# Patient Record
Sex: FEMALE | Race: WHITE | NOT HISPANIC OR LATINO | Employment: UNEMPLOYED | URBAN - METROPOLITAN AREA
[De-identification: names, ages, dates, MRNs, and addresses within clinical notes are randomized per-mention and may not be internally consistent; named-entity substitution may affect disease eponyms.]

---

## 2023-08-01 LAB — SARS-COV-2 RESULT: NOT DETECTED

## 2023-12-03 ENCOUNTER — OFFICE VISIT (OUTPATIENT)
Dept: URGENT CARE | Facility: CLINIC | Age: 68
End: 2023-12-03
Payer: COMMERCIAL

## 2023-12-03 VITALS
BODY MASS INDEX: 30.62 KG/M2 | OXYGEN SATURATION: 96 % | WEIGHT: 156.8 LBS | TEMPERATURE: 98.1 F | SYSTOLIC BLOOD PRESSURE: 109 MMHG | DIASTOLIC BLOOD PRESSURE: 73 MMHG | HEART RATE: 77 BPM

## 2023-12-03 DIAGNOSIS — J22 LOWER RESPIRATORY INFECTION (E.G., BRONCHITIS, PNEUMONIA, PNEUMONITIS, PULMONITIS): Primary | ICD-10-CM

## 2023-12-03 DIAGNOSIS — H10.31 ACUTE CONJUNCTIVITIS OF RIGHT EYE, UNSPECIFIED ACUTE CONJUNCTIVITIS TYPE: ICD-10-CM

## 2023-12-03 PROBLEM — M85.80 OSTEOPENIA, SENILE: Status: ACTIVE | Noted: 2023-12-03

## 2023-12-03 PROBLEM — E89.0 HYPOTHYROIDISM, POSTSURGICAL: Status: ACTIVE | Noted: 2023-12-03

## 2023-12-03 PROBLEM — E66.811 OBESITY, CLASS I, BMI 30.0-34.9 (SEE ACTUAL BMI): Status: ACTIVE | Noted: 2023-12-03

## 2023-12-03 PROBLEM — N60.12 FIBROCYSTIC BREAST CHANGES OF BOTH BREASTS: Status: ACTIVE | Noted: 2019-10-08

## 2023-12-03 PROBLEM — S99.922A INJURY OF LEFT TOE: Status: RESOLVED | Noted: 2023-12-03 | Resolved: 2023-12-03

## 2023-12-03 PROBLEM — L73.2 HIDRADENITIS SUPPURATIVA: Status: ACTIVE | Noted: 2019-09-26

## 2023-12-03 PROBLEM — E66.9 OBESITY, CLASS I, BMI 30.0-34.9 (SEE ACTUAL BMI): Status: ACTIVE | Noted: 2023-12-03

## 2023-12-03 PROBLEM — R73.03 PREDIABETES: Status: ACTIVE | Noted: 2023-12-03

## 2023-12-03 PROBLEM — I47.19 ATRIAL PAROXYSMAL TACHYCARDIA (CMS-HCC): Status: ACTIVE | Noted: 2023-12-03

## 2023-12-03 PROBLEM — U07.1 SEVERE ACUTE RESPIRATORY SYNDROME CORONAVIRUS 2 (SARS-COV-2) DETECTED: Status: RESOLVED | Noted: 2023-12-03 | Resolved: 2023-12-03

## 2023-12-03 PROBLEM — C73 MALIGNANT NEOPLASM OF THYROID GLAND (MULTI): Status: RESOLVED | Noted: 2023-12-03 | Resolved: 2023-12-03

## 2023-12-03 PROBLEM — N60.11 FIBROCYSTIC BREAST CHANGES OF BOTH BREASTS: Status: ACTIVE | Noted: 2019-10-08

## 2023-12-03 PROCEDURE — 99214 OFFICE O/P EST MOD 30 MIN: CPT

## 2023-12-03 PROCEDURE — 1159F MED LIST DOCD IN RCRD: CPT

## 2023-12-03 PROCEDURE — 1036F TOBACCO NON-USER: CPT

## 2023-12-03 RX ORDER — METOPROLOL TARTRATE 25 MG/1
25 TABLET, FILM COATED ORAL 2 TIMES DAILY
COMMUNITY
Start: 2020-02-04

## 2023-12-03 RX ORDER — AZELASTINE 1 MG/ML
2 SPRAY, METERED NASAL 2 TIMES DAILY
COMMUNITY
Start: 2023-11-15 | End: 2024-11-14

## 2023-12-03 RX ORDER — FLUTICASONE PROPIONATE 50 MCG
2 SPRAY, SUSPENSION (ML) NASAL
COMMUNITY
Start: 2023-11-15 | End: 2024-11-14

## 2023-12-03 RX ORDER — LANOLIN ALCOHOL/MO/W.PET/CERES
1000 CREAM (GRAM) TOPICAL
COMMUNITY

## 2023-12-03 RX ORDER — LEVOTHYROXINE SODIUM 100 UG/1
112 TABLET ORAL
COMMUNITY
Start: 2023-09-23

## 2023-12-03 RX ORDER — METFORMIN HYDROCHLORIDE 500 MG/1
500 TABLET ORAL
COMMUNITY
Start: 2022-06-06

## 2023-12-03 RX ORDER — BENZONATATE 200 MG/1
200 CAPSULE ORAL 3 TIMES DAILY PRN
Qty: 21 CAPSULE | Refills: 0 | Status: SHIPPED | OUTPATIENT
Start: 2023-12-03 | End: 2023-12-10

## 2023-12-03 RX ORDER — POLYMYXIN B SULFATE AND TRIMETHOPRIM 1; 10000 MG/ML; [USP'U]/ML
1 SOLUTION OPHTHALMIC EVERY 4 HOURS
Qty: 10 ML | Refills: 0 | Status: SHIPPED | OUTPATIENT
Start: 2023-12-03 | End: 2023-12-13

## 2023-12-03 RX ORDER — AZITHROMYCIN 250 MG/1
TABLET, FILM COATED ORAL
Qty: 6 TABLET | Refills: 0 | Status: SHIPPED | OUTPATIENT
Start: 2023-12-03 | End: 2023-12-30 | Stop reason: ALTCHOICE

## 2023-12-03 ASSESSMENT — ENCOUNTER SYMPTOMS
CARDIOVASCULAR NEGATIVE: 1
SHORTNESS OF BREATH: 0
PALPITATIONS: 0
FEVER: 0
DIARRHEA: 0
CONSTITUTIONAL NEGATIVE: 1
DIAPHORESIS: 0
HEADACHES: 1
RHINORRHEA: 1
COUGH: 1
LIGHT-HEADEDNESS: 0
DIZZINESS: 0
MUSCULOSKELETAL NEGATIVE: 1
SORE THROAT: 1
FATIGUE: 0
VOMITING: 0
MYALGIAS: 0
EYE DISCHARGE: 1
CHILLS: 0
ABDOMINAL PAIN: 0
EYE REDNESS: 1
GASTROINTESTINAL NEGATIVE: 1

## 2023-12-03 ASSESSMENT — VISUAL ACUITY: OU: 1

## 2023-12-03 NOTE — PROGRESS NOTES
Subjective     Susana Patel is a 68 y.o. female who presents for Cough.    Patient presents with dry cough, sore throat, congestion, and right eye redness since yesterday. Has tried Nyquil. Patient did have negative COVID test this morning. She states reports that she had the same symptoms starting 1 week ago, felt better for a couple days, and then worsened again today. She states that the cough never went away. She states that the right eye has been red with irritation and drainage for the last week+ as well.      History provided by:  Patient and medical records       /73   Pulse 77   Temp 36.7 °C (98.1 °F)   Wt 71.1 kg (156 lb 12.8 oz)   SpO2 96%   BMI 30.62 kg/m²    All vitals have been reviewed and are stable.    Review of Systems   Constitutional: Negative.  Negative for chills, diaphoresis, fatigue and fever.   HENT:  Positive for congestion, postnasal drip, rhinorrhea and sore throat.    Eyes:  Positive for discharge and redness.   Respiratory:  Positive for cough. Negative for shortness of breath.    Cardiovascular: Negative.  Negative for chest pain and palpitations.   Gastrointestinal: Negative.  Negative for abdominal pain, diarrhea and vomiting.   Musculoskeletal: Negative.  Negative for myalgias.   Skin: Negative.  Negative for rash.   Neurological:  Positive for headaches. Negative for dizziness and light-headedness.       Objective   Physical Exam  Vitals and nursing note reviewed.   Constitutional:       General: She is not in acute distress.     Appearance: Normal appearance. She is ill-appearing.   HENT:      Head: Normocephalic and atraumatic.      Right Ear: External ear normal.      Left Ear: External ear normal.      Nose: Mucosal edema, congestion and rhinorrhea present.      Mouth/Throat:      Lips: Pink.      Mouth: Mucous membranes are moist.      Palate: No lesions.      Pharynx: Uvula midline. Oropharyngeal exudate and posterior oropharyngeal erythema present.      Tonsils:  No tonsillar exudate.   Eyes:      General: Lids are normal. Vision grossly intact.         Right eye: Discharge present. No hordeolum.      Extraocular Movements: Extraocular movements intact.      Conjunctiva/sclera:      Right eye: Right conjunctiva is not injected.      Left eye: Left conjunctiva is not injected.      Pupils: Pupils are equal, round, and reactive to light.   Cardiovascular:      Rate and Rhythm: Normal rate and regular rhythm.      Heart sounds: Normal heart sounds.   Pulmonary:      Effort: Pulmonary effort is normal. No respiratory distress.      Breath sounds: Normal air entry. No stridor. Decreased breath sounds present.   Abdominal:      General: Abdomen is flat.      Palpations: Abdomen is soft.   Musculoskeletal:         General: Normal range of motion.      Cervical back: Normal range of motion and neck supple.   Lymphadenopathy:      Cervical: No cervical adenopathy.   Skin:     General: Skin is warm and dry.   Neurological:      General: No focal deficit present.      Mental Status: She is alert and oriented to person, place, and time. Mental status is at baseline.   Psychiatric:         Mood and Affect: Mood normal.         Behavior: Behavior normal.         Assessment/Plan   Problem List Items Addressed This Visit    None  Visit Diagnoses       Lower respiratory infection (e.g., bronchitis, pneumonia, pneumonitis, pulmonitis)    -  Primary    Relevant Medications    azithromycin (Zithromax) 250 mg tablet    benzonatate (Tessalon) 200 mg capsule    Acute conjunctivitis of right eye, unspecified acute conjunctivitis type        Relevant Medications    polymyxin B sulf-trimethoprim (Polytrim) ophthalmic solution            Red flags for reporting to ER have been reviewed with the patient.    Current diagnosis, any medication changes, and all in-office lab or radiologic results have been reviewed with the patient at the time of the visit.   If symptoms do not improve or worsen, patient  is to follow up with PCP or report to the emergency room.   Patient is alert and oriented x3 and non-toxic appearing. Vital signs are stable.   Patient and/or guardian has sufficient decision-making capabilities at this time and reports understanding and agreement with the treatment plan made through shared decision-making.

## 2023-12-30 ENCOUNTER — OFFICE VISIT (OUTPATIENT)
Dept: URGENT CARE | Facility: CLINIC | Age: 68
End: 2023-12-30
Payer: COMMERCIAL

## 2023-12-30 VITALS
HEART RATE: 87 BPM | BODY MASS INDEX: 30.04 KG/M2 | HEIGHT: 60 IN | WEIGHT: 153 LBS | SYSTOLIC BLOOD PRESSURE: 105 MMHG | DIASTOLIC BLOOD PRESSURE: 68 MMHG | TEMPERATURE: 97.8 F

## 2023-12-30 DIAGNOSIS — N76.0 ACUTE VAGINITIS: Primary | ICD-10-CM

## 2023-12-30 DIAGNOSIS — R30.0 DYSURIA: ICD-10-CM

## 2023-12-30 DIAGNOSIS — R10.9 ACUTE RIGHT FLANK PAIN: ICD-10-CM

## 2023-12-30 LAB
POC APPEARANCE, URINE: CLEAR
POC BILIRUBIN, URINE: NEGATIVE
POC BLOOD, URINE: NEGATIVE
POC COLOR, URINE: NORMAL
POC GLUCOSE, URINE: NEGATIVE MG/DL
POC KETONES, URINE: NEGATIVE MG/DL
POC LEUKOCYTES, URINE: NEGATIVE
POC NITRITE,URINE: NEGATIVE
POC PH, URINE: 5.5 PH
POC PROTEIN, URINE: NEGATIVE MG/DL
POC SPECIFIC GRAVITY, URINE: >=1.03
POC UROBILINOGEN, URINE: 0.2 EU/DL

## 2023-12-30 PROCEDURE — 1160F RVW MEDS BY RX/DR IN RCRD: CPT

## 2023-12-30 PROCEDURE — 1036F TOBACCO NON-USER: CPT

## 2023-12-30 PROCEDURE — 1159F MED LIST DOCD IN RCRD: CPT

## 2023-12-30 PROCEDURE — 81003 URINALYSIS AUTO W/O SCOPE: CPT | Mod: CLIA WAIVED TEST

## 2023-12-30 PROCEDURE — 87086 URINE CULTURE/COLONY COUNT: CPT

## 2023-12-30 PROCEDURE — 99214 OFFICE O/P EST MOD 30 MIN: CPT

## 2023-12-30 RX ORDER — TAMSULOSIN HYDROCHLORIDE 0.4 MG/1
0.4 CAPSULE ORAL DAILY
Qty: 10 CAPSULE | Refills: 0 | Status: SHIPPED | OUTPATIENT
Start: 2023-12-30 | End: 2024-01-09

## 2023-12-30 RX ORDER — FLUCONAZOLE 150 MG/1
150 TABLET ORAL ONCE
Qty: 1 TABLET | Refills: 0 | Status: SHIPPED | OUTPATIENT
Start: 2023-12-30 | End: 2023-12-30

## 2023-12-30 RX ORDER — ERYTHROMYCIN 5 MG/G
OINTMENT OPHTHALMIC
COMMUNITY
Start: 2023-08-13

## 2023-12-30 ASSESSMENT — ENCOUNTER SYMPTOMS
PALPITATIONS: 0
WHEEZING: 0
NEUROLOGICAL NEGATIVE: 1
CONSTITUTIONAL NEGATIVE: 1
MYALGIAS: 0
FATIGUE: 0
HEADACHES: 0
VOMITING: 0
RHINORRHEA: 0
FEVER: 0
CARDIOVASCULAR NEGATIVE: 1
DIAPHORESIS: 0
DIZZINESS: 0
LIGHT-HEADEDNESS: 0
BACK PAIN: 0
FREQUENCY: 1
DIFFICULTY URINATING: 0
DIARRHEA: 0
COUGH: 0
CHILLS: 0
SORE THROAT: 0
SHORTNESS OF BREATH: 0
NAUSEA: 0
ABDOMINAL PAIN: 1
DYSURIA: 1
RESPIRATORY NEGATIVE: 1
HEMATURIA: 0
FLANK PAIN: 1
LOSS OF CONSCIOUSNESS: 0

## 2023-12-30 NOTE — PROGRESS NOTES
Subjective     Susana Patel is a 68 y.o. female who presents for UTI.    Patient presents with right flank pain wrapping around to her lower abdomen for the last 3 days. She's states that she has urinary frequency and some urgency but has been drinking a lot of water. She reports a history of kidney stones, but the pain is not as bad a previous yet. She reports some vaginal irritation/itchiness as well.       History provided by:  Medical records and patient  UTI  Severity:  Moderate  Duration:  4 days  Timing:  Intermittent  Progression:  Unchanged  Associated symptoms: abdominal pain    Associated symptoms: no chest pain, no congestion, no cough, no diarrhea, no ear pain, no fatigue, no fever, no headaches, no loss of consciousness, no myalgias, no nausea, no rash, no rhinorrhea, no shortness of breath, no sore throat, no vomiting and no wheezing        /68 (BP Location: Right arm, Patient Position: Sitting, BP Cuff Size: Adult)   Pulse 87   Temp 36.6 °C (97.8 °F) (Oral)   Ht 1.524 m (5')   Wt 69.4 kg (153 lb)   BMI 29.88 kg/m²    All vitals have been reviewed and are stable.    Review of Systems   Constitutional: Negative.  Negative for chills, diaphoresis, fatigue and fever.   HENT: Negative.  Negative for congestion, ear pain, rhinorrhea and sore throat.    Respiratory: Negative.  Negative for cough, shortness of breath and wheezing.    Cardiovascular: Negative.  Negative for chest pain and palpitations.   Gastrointestinal:  Positive for abdominal pain. Negative for diarrhea, nausea and vomiting.   Genitourinary:  Positive for dysuria, flank pain, frequency, urgency and vaginal discharge. Negative for difficulty urinating, genital sores, hematuria, pelvic pain, vaginal bleeding and vaginal pain.   Musculoskeletal:  Negative for back pain and myalgias.   Skin: Negative.  Negative for rash.   Neurological: Negative.  Negative for dizziness, loss of consciousness, light-headedness and headaches.        Objective   Physical Exam  Vitals and nursing note reviewed.   Constitutional:       General: She is awake. She is not in acute distress.     Appearance: Normal appearance. She is well-developed. She is not ill-appearing.   HENT:      Head: Normocephalic and atraumatic.      Right Ear: External ear normal.      Left Ear: External ear normal.      Nose: Nose normal.      Mouth/Throat:      Mouth: Mucous membranes are moist.   Eyes:      Extraocular Movements: Extraocular movements intact.      Conjunctiva/sclera: Conjunctivae normal.      Pupils: Pupils are equal, round, and reactive to light.   Cardiovascular:      Rate and Rhythm: Normal rate and regular rhythm.   Pulmonary:      Effort: Pulmonary effort is normal. No respiratory distress.      Breath sounds: No stridor.   Abdominal:      General: Abdomen is flat.      Palpations: Abdomen is soft.      Tenderness: There is abdominal tenderness in the suprapubic area. There is right CVA tenderness. There is no left CVA tenderness.   Musculoskeletal:         General: No swelling or deformity. Normal range of motion.      Cervical back: Normal range of motion and neck supple.   Skin:     General: Skin is warm and dry.      Findings: No rash.   Neurological:      General: No focal deficit present.      Mental Status: She is alert and oriented to person, place, and time. Mental status is at baseline.      Motor: Motor function is intact.      Coordination: Coordination is intact.   Psychiatric:         Mood and Affect: Mood and affect normal.         Speech: Speech normal.         Behavior: Behavior normal.         Assessment/Plan   Problem List Items Addressed This Visit    None  Visit Diagnoses       Acute vaginitis    -  Primary    Relevant Medications    fluconazole (Diflucan) 150 mg tablet    Dysuria        Relevant Medications    tamsulosin (Flomax) 0.4 mg 24 hr capsule    Other Relevant Orders    Urine Culture    POCT UA Automated manually resulted  (Completed)    Acute right flank pain        Relevant Medications    tamsulosin (Flomax) 0.4 mg 24 hr capsule            Red flags for reporting to ER have been reviewed with the patient.    Current diagnosis, any medication changes, and all in-office lab or radiologic results have been reviewed with the patient at the time of the visit.   If symptoms do not improve or worsen, patient is to follow up with PCP or report to the emergency room.   Patient is alert and oriented x3 and non-toxic appearing. Vital signs are stable.   Patient and/or guardian has sufficient decision-making capabilities at this time and reports understanding and agreement with the treatment plan made through shared decision-making.

## 2023-12-31 LAB — BACTERIA UR CULT: NORMAL

## 2024-03-05 ENCOUNTER — OFFICE VISIT (OUTPATIENT)
Dept: GASTROENTEROLOGY | Facility: CLINIC | Age: 69
End: 2024-03-05
Payer: COMMERCIAL

## 2024-03-05 ENCOUNTER — LAB (OUTPATIENT)
Dept: LAB | Facility: LAB | Age: 69
End: 2024-03-05
Payer: COMMERCIAL

## 2024-03-05 VITALS
HEIGHT: 60 IN | SYSTOLIC BLOOD PRESSURE: 120 MMHG | WEIGHT: 156 LBS | BODY MASS INDEX: 30.63 KG/M2 | DIASTOLIC BLOOD PRESSURE: 76 MMHG | HEART RATE: 73 BPM | OXYGEN SATURATION: 97 %

## 2024-03-05 DIAGNOSIS — R10.84 GENERALIZED ABDOMINAL PAIN: Primary | ICD-10-CM

## 2024-03-05 DIAGNOSIS — K76.0 FATTY LIVER: ICD-10-CM

## 2024-03-05 DIAGNOSIS — R10.84 GENERALIZED ABDOMINAL PAIN: ICD-10-CM

## 2024-03-05 LAB
ALBUMIN SERPL BCP-MCNC: 4.1 G/DL (ref 3.4–5)
ALP SERPL-CCNC: 96 U/L (ref 33–136)
ALT SERPL W P-5'-P-CCNC: 37 U/L (ref 7–45)
ANION GAP SERPL CALC-SCNC: 13 MMOL/L (ref 10–20)
AST SERPL W P-5'-P-CCNC: 28 U/L (ref 9–39)
BASOPHILS # BLD AUTO: 0.05 X10*3/UL (ref 0–0.1)
BASOPHILS NFR BLD AUTO: 0.6 %
BILIRUB SERPL-MCNC: 0.3 MG/DL (ref 0–1.2)
BUN SERPL-MCNC: 13 MG/DL (ref 6–23)
CALCIUM SERPL-MCNC: 9 MG/DL (ref 8.6–10.3)
CHLORIDE SERPL-SCNC: 106 MMOL/L (ref 98–107)
CO2 SERPL-SCNC: 26 MMOL/L (ref 21–32)
CREAT SERPL-MCNC: 0.71 MG/DL (ref 0.5–1.05)
EGFRCR SERPLBLD CKD-EPI 2021: >90 ML/MIN/1.73M*2
EOSINOPHIL # BLD AUTO: 0.16 X10*3/UL (ref 0–0.7)
EOSINOPHIL NFR BLD AUTO: 1.8 %
ERYTHROCYTE [DISTWIDTH] IN BLOOD BY AUTOMATED COUNT: 14.2 % (ref 11.5–14.5)
GLUCOSE SERPL-MCNC: 80 MG/DL (ref 74–99)
HCT VFR BLD AUTO: 43.7 % (ref 36–46)
HGB BLD-MCNC: 14.2 G/DL (ref 12–16)
IMM GRANULOCYTES # BLD AUTO: 0.03 X10*3/UL (ref 0–0.7)
IMM GRANULOCYTES NFR BLD AUTO: 0.3 % (ref 0–0.9)
LYMPHOCYTES # BLD AUTO: 3.75 X10*3/UL (ref 1.2–4.8)
LYMPHOCYTES NFR BLD AUTO: 42.5 %
MCH RBC QN AUTO: 28.6 PG (ref 26–34)
MCHC RBC AUTO-ENTMCNC: 32.5 G/DL (ref 32–36)
MCV RBC AUTO: 88 FL (ref 80–100)
MONOCYTES # BLD AUTO: 0.67 X10*3/UL (ref 0.1–1)
MONOCYTES NFR BLD AUTO: 7.6 %
NEUTROPHILS # BLD AUTO: 4.17 X10*3/UL (ref 1.2–7.7)
NEUTROPHILS NFR BLD AUTO: 47.2 %
NRBC BLD-RTO: 0 /100 WBCS (ref 0–0)
PLATELET # BLD AUTO: 292 X10*3/UL (ref 150–450)
POTASSIUM SERPL-SCNC: 4.5 MMOL/L (ref 3.5–5.3)
PROT SERPL-MCNC: 7.3 G/DL (ref 6.4–8.2)
RBC # BLD AUTO: 4.96 X10*6/UL (ref 4–5.2)
SODIUM SERPL-SCNC: 140 MMOL/L (ref 136–145)
WBC # BLD AUTO: 8.8 X10*3/UL (ref 4.4–11.3)

## 2024-03-05 PROCEDURE — 36415 COLL VENOUS BLD VENIPUNCTURE: CPT

## 2024-03-05 PROCEDURE — 1036F TOBACCO NON-USER: CPT | Performed by: INTERNAL MEDICINE

## 2024-03-05 PROCEDURE — 1159F MED LIST DOCD IN RCRD: CPT | Performed by: INTERNAL MEDICINE

## 2024-03-05 PROCEDURE — 85025 COMPLETE CBC W/AUTO DIFF WBC: CPT

## 2024-03-05 PROCEDURE — 80053 COMPREHEN METABOLIC PANEL: CPT

## 2024-03-05 PROCEDURE — 99204 OFFICE O/P NEW MOD 45 MIN: CPT | Performed by: INTERNAL MEDICINE

## 2024-03-05 NOTE — PROGRESS NOTES
[facility] Gastroenterology    ASSESSMENT and PLAN:       Susana Patel is a 68 y.o. female with a significant past medical history of DM,HTNHypothyroidismn  who presents for consultation requested by her primary care provider (Marjan Perez MD) for the evaluation of  abdominal pain and possible diverticulitis.        Abdominal Pain  - patient with ct abdomen and pelvis at [facility] that is not available for review today  - Obtain CMP CBC with diff for eval   - Patient to sign record release for   _ Follow up in 4- 6 weeks     2. Fatty Liver   - CMP for eval   - Patient counsled to lose 10% of her body weight over hte next year     Suhas Almaguer,          Gastroenterology    [facility]            Subjective   HISTORY OF PRESENT ILLNESS:     Chief Complaint  New Patient Visit (Diverticulitis - abnormal CT scan/Negative cologuard 4/2023 Last colonoscopy done apprx 8 years ago - unsure where)    History Of Present Illness:    Susana Patel is a 68 y.o. female with a significant past medical history of DM,HTNHypothyroidismn  who presents for consultation requested by her primary care provider (Marjan Preez MD) for the evaluation of  abdominal pain and possible diverticulitis.          Patient was diagnosed with diverticulitis at [facility] appro 3-4 weeks prior. Patient had an outpatient ct done that was ordered by PCP and she has diverticulosis and fatty liver         Patient denies any heartburn/GERD, N/V, dysphagia, odynophagia, abdominal pain, diarrhea, constipation, hematemesis, hematochezia, melena, or weight loss.      Endoscopy History:    Colon 8-10 ye    Review of systems:   Review of Systems      I performed a complete 10 point review of systems and it is negative except as noted in HPI or above.        PAST HISTORIES:       Past Medical History:  She has a past medical history of Injury of left toe  (2023), Malignant neoplasm of thyroid gland (CMS/HCC) (2023), Other specified health status, and Severe acute respiratory syndrome coronavirus 2 (SARS-CoV-2) detected (2023).    Past Surgical History:  She has a past surgical history that includes Tonsillectomy; Thyroid surgery;  section, classic; Cholecystectomy; and Hysterectomy.      Social History:  She reports that she has never smoked. She has never used smokeless tobacco. She reports that she does not drink alcohol and does not use drugs.    Family History:  No known GI disease, specifically denies pancreatitis, Crohn's, colon cancer, gastroesophageal cancer, or ulcerative colitis.    No family history on file.     Allergies:  Patient has no known allergies.        Objective   OBJECTIVE:       Last Recorded Vitals:  Vitals:    24 1418   BP: 120/76   Pulse: 73   SpO2: 97%   Weight: 70.8 kg (156 lb)   Height: 1.524 m (5')     /76   Pulse 73   Ht 1.524 m (5')   Wt 70.8 kg (156 lb)   SpO2 97%   BMI 30.47 kg/m²      Physical Exam:    Physical Exam  Vitals reviewed.   Constitutional:       General: She is awake.      Appearance: Normal appearance.   HENT:      Head: Normocephalic.      Mouth/Throat:      Mouth: Mucous membranes are moist.   Eyes:      Extraocular Movements: Extraocular movements intact.   Cardiovascular:      Rate and Rhythm: Normal rate.      Heart sounds: Normal heart sounds.   Pulmonary:      Effort: Pulmonary effort is normal.      Breath sounds: Normal breath sounds.   Abdominal:      General: Bowel sounds are normal.      Palpations: Abdomen is soft.      Tenderness: There is no abdominal tenderness. There is no guarding or rebound.      Hernia: No hernia is present.   Musculoskeletal:         General: Normal range of motion.      Cervical back: Neck supple.   Skin:     General: Skin is warm and dry.   Neurological:      General: No focal deficit present.      Mental Status: She is alert.  "  Psychiatric:         Attention and Perception: Attention and perception normal.         Mood and Affect: Mood normal.         Behavior: Behavior normal.             Home Medications:  Prior to Admission medications    Medication Sig Start Date End Date Taking? Authorizing Provider   azelastine (Astelin) 137 mcg (0.1 %) nasal spray 2 sprays twice a day. 11/15/23 11/14/24  Historical Provider, MD   cyanocobalamin (Vitamin B-12) 1,000 mcg tablet Take 1 tablet (1,000 mcg) by mouth once daily.    Historical Provider, MD   erythromycin (Romycin) 5 mg/gram (0.5 %) ophthalmic ointment Apply to affected eye(s). 8/13/23   Historical Provider, MD   fluticasone (Flonase) 50 mcg/actuation nasal spray 2 sprays once daily. 11/15/23 11/14/24  Historical Provider, MD   levothyroxine (Synthroid, Levoxyl) 100 mcg tablet Take 1 tablet (100 mcg) by mouth once daily. 9/23/23   Historical Provider, MD   metFORMIN (Glucophage) 500 mg tablet Take 1 tablet (500 mg) by mouth once daily. 6/6/22   Historical Provider, MD   metoprolol tartrate (Lopressor) 25 mg tablet Take 1 tablet (25 mg) by mouth twice a day. 2/4/20   Historical Provider, MD         Relevant Results Recent labs reviewed in the EMR.  Lab Results   Component Value Date    HGB 14.2 03/05/2024    MCV 88 03/05/2024     03/05/2024       No results found for: \"FERRITIN\", \"IRON\"    Lab Results   Component Value Date     03/05/2024    K 4.5 03/05/2024     03/05/2024    BUN 13 03/05/2024    CREATININE 0.71 03/05/2024       Lab Results   Component Value Date    BILITOT 0.3 03/05/2024     Lab Results   Component Value Date    ALT 37 03/05/2024    AST 28 03/05/2024    ALKPHOS 96 03/05/2024       No results found for: \"CRP\"    No results found for: \"CALPS\"    Radiology: Reviewed imaging reviewed in the EMR.  No results found.      "

## 2024-03-05 NOTE — PATIENT INSTRUCTIONS
Please sign record release to obtain the CT abdomen pelvis from your primary care physician    Please obtain lab work for evaluation of your fatty liver that is reported on the CT    Follow-up in 3 months

## 2024-03-08 ENCOUNTER — TELEPHONE (OUTPATIENT)
Dept: GASTROENTEROLOGY | Facility: CLINIC | Age: 69
End: 2024-03-08
Payer: COMMERCIAL

## 2024-04-19 ENCOUNTER — APPOINTMENT (OUTPATIENT)
Dept: GASTROENTEROLOGY | Facility: CLINIC | Age: 69
End: 2024-04-19
Payer: COMMERCIAL

## 2024-07-22 ENCOUNTER — OFFICE VISIT (OUTPATIENT)
Dept: URGENT CARE | Facility: CLINIC | Age: 69
End: 2024-07-22
Payer: COMMERCIAL

## 2024-07-22 VITALS
BODY MASS INDEX: 31.88 KG/M2 | HEART RATE: 64 BPM | HEIGHT: 60 IN | WEIGHT: 162.4 LBS | SYSTOLIC BLOOD PRESSURE: 110 MMHG | DIASTOLIC BLOOD PRESSURE: 51 MMHG | OXYGEN SATURATION: 94 % | TEMPERATURE: 98.7 F

## 2024-07-22 DIAGNOSIS — L03.031 PARONYCHIA OF GREAT TOE OF RIGHT FOOT: Primary | ICD-10-CM

## 2024-07-22 DIAGNOSIS — M79.674 TOE PAIN, RIGHT: ICD-10-CM

## 2024-07-22 PROCEDURE — 3008F BODY MASS INDEX DOCD: CPT

## 2024-07-22 PROCEDURE — 1159F MED LIST DOCD IN RCRD: CPT

## 2024-07-22 PROCEDURE — 99213 OFFICE O/P EST LOW 20 MIN: CPT

## 2024-07-22 RX ORDER — FLUCONAZOLE 150 MG/1
1 TABLET ORAL
COMMUNITY
Start: 2023-12-30

## 2024-07-22 RX ORDER — CEPHALEXIN 500 MG/1
500 CAPSULE ORAL 3 TIMES DAILY
Qty: 21 CAPSULE | Refills: 0 | Status: SHIPPED | OUTPATIENT
Start: 2024-07-22 | End: 2024-07-29

## 2024-07-22 RX ORDER — METHYLPREDNISOLONE 4 MG/1
TABLET ORAL
Qty: 21 TABLET | Refills: 0 | Status: SHIPPED | OUTPATIENT
Start: 2024-07-22

## 2024-07-22 RX ORDER — BENZONATATE 100 MG/1
CAPSULE ORAL
COMMUNITY
Start: 2024-04-15

## 2024-07-22 ASSESSMENT — ENCOUNTER SYMPTOMS
COUGH: 0
FATIGUE: 0
FEVER: 0
HEADACHES: 0
GASTROINTESTINAL NEGATIVE: 1
VOMITING: 0
TROUBLE SWALLOWING: 0
ABDOMINAL PAIN: 0
PALPITATIONS: 0
ARTHRALGIAS: 0
RHINORRHEA: 0
DIZZINESS: 0
DIARRHEA: 0
CONSTITUTIONAL NEGATIVE: 1
MUSCULOSKELETAL NEGATIVE: 1
VOICE CHANGE: 0
CARDIOVASCULAR NEGATIVE: 1
SORE THROAT: 0
DIAPHORESIS: 0
NAUSEA: 0
MYALGIAS: 0
FACIAL SWELLING: 0
WOUND: 1
SHORTNESS OF BREATH: 0
JOINT SWELLING: 0
COLOR CHANGE: 1
NEUROLOGICAL NEGATIVE: 1
CHILLS: 0
RESPIRATORY NEGATIVE: 1

## 2024-07-22 ASSESSMENT — VISUAL ACUITY: OU: 1

## 2024-07-22 NOTE — PATIENT INSTRUCTIONS
PARONYCHIA      - Discussed options of I&D vs antibiotic treatment of paronychia    - CEPHALEXIN was prescribed for antibiotic treatment of skin infection    - METHYLPREDNISOLONE was prescribed to help reduce pain and inflammation     - Keep wound CLEAN and DRY, covered with gauze or bandaid if needed    - Soapy water or Epsom salt soaks or warm compresses of the abscess may be performed 2-4 times per day to promote drainage and healing     - Once skin heals, neosporin may be used over top wound    - If fever, rash, increased pain, swelling. redness, or red streaks in the skin near the wound site occur, seek emergency care  - Once the wound is closed or scabbed over, Aquaphor, Vaseline or Bio-oil may be beneficial to reduce scarring     Steroids are strong medications that mimic the body's natural production of cortisol and is prescribed to reduce inflammation especially when pain is caused by inflammation. Short-term side effects of steroids reviewed, including gastritis, insomnia, moodiness, acne, fluid retention (leg swelling) and potential for increase in blood pressure or sugar. If you have have a history of hypertension or diabetes, you should monitor at home regularly while on this medication and discontinue if levels are high (BP>160/90 or glucose >200). Steroids are often prescribed to be tapered off into lower doses after prolonged use to avoid withdrawal symptoms such as fatigue, weakness, body aches, nausea, and lightheadedness.  However, if a severe reaction is noted (including rash or difficulty breathing) discontinue and seek emergency care immediately.

## 2024-07-22 NOTE — PROGRESS NOTES
Subjective   History  Susana Patel is a 68 y.o. female who presents for Joint Pain.    Patient presents with pain, redness, and swelling of her right great toe near the nailbed for the last 4 days. She denies any drainage from the area. She denies a history of gout, but states that she was concerned for that since the pain was slowly building over the last 3-4 weeks.       History provided by:  Patient and medical records   used: No      Past Surgical History:   Procedure Laterality Date     SECTION, CLASSIC      CHOLECYSTECTOMY      HYSTERECTOMY      THYROID SURGERY      TONSILLECTOMY       Social History     Tobacco Use    Smoking status: Never    Smokeless tobacco: Never   Substance Use Topics    Alcohol use: Never    Drug use: Never       Review of Systems   Review of Systems   Constitutional: Negative.  Negative for chills, diaphoresis, fatigue and fever.   HENT: Negative.  Negative for congestion, facial swelling, rhinorrhea, sore throat, trouble swallowing and voice change.    Respiratory: Negative.  Negative for cough and shortness of breath.    Cardiovascular: Negative.  Negative for chest pain and palpitations.   Gastrointestinal: Negative.  Negative for abdominal pain, diarrhea, nausea and vomiting.   Musculoskeletal: Negative.  Negative for arthralgias, gait problem, joint swelling and myalgias.   Skin:  Positive for color change and wound. Negative for rash.   Neurological: Negative.  Negative for dizziness and headaches.       Objective   Vital Signs  /51 (BP Location: Left arm, Patient Position: Sitting, BP Cuff Size: Small adult)   Pulse 64   Temp 37.1 °C (98.7 °F) (Oral)   Ht 1.524 m (5')   Wt 73.7 kg (162 lb 6.4 oz)   SpO2 94%   BMI 31.72 kg/m²    All vitals have been reviewed and are stable.     Physical Exam  Physical Exam  Vitals and nursing note reviewed.   Constitutional:       General: She is awake. She is not in acute distress.     Appearance: Normal  appearance. She is well-developed. She is not ill-appearing, toxic-appearing or diaphoretic.   HENT:      Head: Normocephalic and atraumatic. No right periorbital erythema or left periorbital erythema.      Jaw: There is normal jaw occlusion.      Right Ear: External ear normal.      Left Ear: External ear normal.      Nose: Nose normal. No congestion or rhinorrhea.      Mouth/Throat:      Lips: Pink. No lesions.      Mouth: Mucous membranes are moist. No oral lesions or angioedema.      Pharynx: Oropharynx is clear.   Eyes:      General: Lids are normal. Vision grossly intact. Gaze aligned appropriately.      Extraocular Movements: Extraocular movements intact.      Conjunctiva/sclera: Conjunctivae normal.      Right eye: Right conjunctiva is not injected.      Left eye: Left conjunctiva is not injected.      Pupils: Pupils are equal, round, and reactive to light.   Cardiovascular:      Rate and Rhythm: Normal rate and regular rhythm.   Pulmonary:      Effort: Pulmonary effort is normal. No tachypnea or respiratory distress.      Breath sounds: Normal air entry. No stridor. No wheezing.   Abdominal:      General: Abdomen is flat. There is no distension.      Palpations: Abdomen is soft.   Musculoskeletal:         General: No swelling or deformity.      Cervical back: Full passive range of motion without pain, normal range of motion and neck supple.      Right foot: Normal range of motion. Swelling (base of great toe nail with erythema), tenderness (base of great toe nail) and bony tenderness (1st toe distal IP joint) present. No deformity.      Left foot: Normal. Normal range of motion. No swelling.   Skin:     General: Skin is warm and dry.      Findings: No erythema or rash.   Neurological:      General: No focal deficit present.      Mental Status: She is alert and oriented to person, place, and time. Mental status is at baseline.      Motor: Motor function is intact.      Coordination: Coordination is intact.    Psychiatric:         Mood and Affect: Mood and affect normal.         Speech: Speech normal.         Behavior: Behavior normal. Behavior is cooperative.         Thought Content: Thought content normal.         Judgment: Judgment normal.         Diagnostic Results   No results found for this or any previous visit (from the past 48 hour(s)).    Assessment/Plan   Procedures   N/A    Problem List Items Addressed This Visit    None  Visit Diagnoses       Paronychia of great toe of right foot    -  Primary    Relevant Medications    cephalexin (Keflex) 500 mg capsule    methylPREDNISolone (Medrol Dospak) 4 mg tablets    Toe pain, right        Relevant Medications    methylPREDNISolone (Medrol Dospak) 4 mg tablets            UC Course  Patient disposition: Home    Red flags for reporting to ER have been reviewed with the patient.    Current diagnosis, any medication changes, and all in-office lab or radiologic results have been reviewed with the patient at the time of the visit.   If symptoms do not improve or worsen, patient is to follow up with PCP or report to the emergency room.   Patient is alert and oriented x3 and non-toxic appearing. Vital signs are stable.   Patient and/or guardian has sufficient decision-making capabilities at this time and reports understanding and agreement with the treatment plan made through shared decision-making.

## 2024-11-12 ENCOUNTER — OFFICE VISIT (OUTPATIENT)
Dept: URGENT CARE | Facility: CLINIC | Age: 69
End: 2024-11-12
Payer: COMMERCIAL

## 2024-11-12 VITALS
HEIGHT: 60 IN | TEMPERATURE: 98.3 F | BODY MASS INDEX: 31.65 KG/M2 | SYSTOLIC BLOOD PRESSURE: 114 MMHG | OXYGEN SATURATION: 94 % | DIASTOLIC BLOOD PRESSURE: 75 MMHG | HEART RATE: 80 BPM | WEIGHT: 161.2 LBS

## 2024-11-12 DIAGNOSIS — J06.9 VIRAL UPPER RESPIRATORY TRACT INFECTION: ICD-10-CM

## 2024-11-12 PROBLEM — E11.69 TYPE 2 DIABETES MELLITUS WITH OTHER SPECIFIED COMPLICATION: Status: ACTIVE | Noted: 2024-11-12

## 2024-11-12 PROBLEM — I48.0 PAROXYSMAL ATRIAL FIBRILLATION (MULTI): Status: ACTIVE | Noted: 2024-11-12

## 2024-11-12 PROBLEM — E66.01 MORBID (SEVERE) OBESITY DUE TO EXCESS CALORIES (MULTI): Status: ACTIVE | Noted: 2024-11-12

## 2024-11-12 PROBLEM — E11.69 TYPE 2 DIABETES MELLITUS WITH OTHER SPECIFIED COMPLICATION: Status: RESOLVED | Noted: 2024-11-12 | Resolved: 2024-11-12

## 2024-11-12 LAB
POC RAPID INFLUENZA B: NEGATIVE
POC SARS-COV-2 AG BINAX: NORMAL

## 2024-11-12 PROCEDURE — 87804 INFLUENZA ASSAY W/OPTIC: CPT | Mod: CLIA WAIVED TEST

## 2024-11-12 PROCEDURE — 87811 SARS-COV-2 COVID19 W/OPTIC: CPT | Mod: CLIA WAIVED TEST

## 2024-11-12 PROCEDURE — 1036F TOBACCO NON-USER: CPT

## 2024-11-12 PROCEDURE — 87636 SARSCOV2 & INF A&B AMP PRB: CPT

## 2024-11-12 PROCEDURE — 1159F MED LIST DOCD IN RCRD: CPT

## 2024-11-12 PROCEDURE — 3008F BODY MASS INDEX DOCD: CPT

## 2024-11-12 PROCEDURE — 99213 OFFICE O/P EST LOW 20 MIN: CPT

## 2024-11-12 RX ORDER — METOPROLOL SUCCINATE 25 MG/1
TABLET, EXTENDED RELEASE ORAL
COMMUNITY
Start: 2024-10-25

## 2024-11-12 RX ORDER — BROMPHENIRAMINE MALEATE, PSEUDOEPHEDRINE HYDROCHLORIDE, AND DEXTROMETHORPHAN HYDROBROMIDE 2; 30; 10 MG/5ML; MG/5ML; MG/5ML
10 SYRUP ORAL 4 TIMES DAILY PRN
Qty: 120 ML | Refills: 0 | Status: SHIPPED | OUTPATIENT
Start: 2024-11-12 | End: 2024-11-22

## 2024-11-12 RX ORDER — BENZONATATE 200 MG/1
200 CAPSULE ORAL 3 TIMES DAILY PRN
Qty: 21 CAPSULE | Refills: 0 | Status: SHIPPED | OUTPATIENT
Start: 2024-11-12 | End: 2024-11-19

## 2024-11-12 RX ORDER — TAMSULOSIN HYDROCHLORIDE 0.4 MG/1
1 CAPSULE ORAL
COMMUNITY
Start: 2024-08-30

## 2024-11-12 RX ORDER — PREDNISONE 10 MG/1
TABLET ORAL
Qty: 10 TABLET | Refills: 0 | Status: SHIPPED | OUTPATIENT
Start: 2024-11-12 | End: 2024-11-20

## 2024-11-12 RX ORDER — AMOXICILLIN AND CLAVULANATE POTASSIUM 875; 125 MG/1; MG/1
1 TABLET, FILM COATED ORAL
COMMUNITY
Start: 2024-07-31 | End: 2024-11-12 | Stop reason: ALTCHOICE

## 2024-11-12 ASSESSMENT — ENCOUNTER SYMPTOMS
NAUSEA: 0
PALPITATIONS: 0
LIGHT-HEADEDNESS: 0
DIZZINESS: 0
VOICE CHANGE: 1
WHEEZING: 0
COLOR CHANGE: 0
ABDOMINAL PAIN: 0
RHINORRHEA: 1
CHEST TIGHTNESS: 1
SHORTNESS OF BREATH: 1
CHILLS: 0
HOARSE VOICE: 1
HEADACHES: 1
TROUBLE SWALLOWING: 0
DIARRHEA: 0
FEVER: 0
SORE THROAT: 1
VOMITING: 0
WEAKNESS: 0
ARTHRALGIAS: 0
FATIGUE: 1
COUGH: 1
WOUND: 0
FACIAL SWELLING: 0
EYE REDNESS: 0
GASTROINTESTINAL NEGATIVE: 1
DIAPHORESIS: 1
MYALGIAS: 1
CARDIOVASCULAR NEGATIVE: 1

## 2024-11-12 ASSESSMENT — VISUAL ACUITY: OU: 1

## 2024-11-12 NOTE — PROGRESS NOTES
Subjective   History  Susana Patel is a 69 y.o. female who presents for Sore Throat and Cough.    Patient reports cough, sinus congestion/drainage, sore throat, muscle aches, and headaches for the last 2 days. She reports shortness of breath with exertion while playing pickleball this morning. She reports a history of pharyngitis that usually feels like this, but declines any strep testing.       History provided by:  Patient and medical records   used: No    Sore Throat   This is a new problem. The current episode started in the past 7 days. The problem has been unchanged. There has been no fever. The pain is at a severity of 6/10. The pain is moderate. Associated symptoms include congestion, coughing, headaches, a hoarse voice and shortness of breath. Pertinent negatives include no abdominal pain, diarrhea, ear pain, trouble swallowing or vomiting. She has tried NSAIDs for the symptoms. The treatment provided no relief.   Cough  Associated symptoms include headaches, myalgias, nasal congestion, rhinorrhea, a sore throat and shortness of breath. Pertinent negatives include no chest pain, chills, ear congestion, ear pain, eye redness, fever, rash or wheezing. She has tried nothing for the symptoms. Her past medical history is significant for bronchitis.     Past Surgical History:   Procedure Laterality Date     SECTION, CLASSIC      CHOLECYSTECTOMY      HYSTERECTOMY      THYROID SURGERY      TONSILLECTOMY       Social History     Tobacco Use    Smoking status: Never    Smokeless tobacco: Never   Substance Use Topics    Alcohol use: Never    Drug use: Never       Review of Systems   Review of Systems   Constitutional:  Positive for diaphoresis and fatigue. Negative for chills and fever.   HENT:  Positive for congestion, hoarse voice, rhinorrhea, sore throat and voice change. Negative for ear pain, facial swelling and trouble swallowing.    Eyes:  Negative for redness.   Respiratory:   Positive for cough, chest tightness and shortness of breath. Negative for wheezing.    Cardiovascular: Negative.  Negative for chest pain and palpitations.   Gastrointestinal: Negative.  Negative for abdominal pain, diarrhea, nausea and vomiting.   Musculoskeletal:  Positive for myalgias. Negative for arthralgias.   Skin: Negative.  Negative for color change, rash and wound.   Neurological:  Positive for headaches. Negative for dizziness, weakness and light-headedness.       Objective   Vital Signs  /75 (BP Location: Left arm, Patient Position: Sitting, BP Cuff Size: Small adult)   Pulse 80   Temp 36.8 °C (98.3 °F) (Oral)   Ht 1.524 m (5')   Wt 73.1 kg (161 lb 3.2 oz)   SpO2 94%   BMI 31.48 kg/m²    All vitals have been reviewed and are stable.     Physical Exam  Physical Exam  Vitals and nursing note reviewed.   Constitutional:       General: She is not in acute distress.     Appearance: Normal appearance. She is ill-appearing. She is not toxic-appearing or diaphoretic.   HENT:      Head: Normocephalic and atraumatic. No right periorbital erythema or left periorbital erythema.      Jaw: There is normal jaw occlusion.      Right Ear: External ear normal.      Left Ear: External ear normal.      Nose: Mucosal edema, congestion and rhinorrhea present.      Mouth/Throat:      Lips: Pink. No lesions.      Mouth: Mucous membranes are moist.      Palate: No lesions.      Pharynx: Uvula midline. Posterior oropharyngeal erythema and postnasal drip present. No uvula swelling.      Tonsils: No tonsillar exudate.   Eyes:      General: Lids are normal. Vision grossly intact.         Right eye: No discharge.         Left eye: No discharge.      Extraocular Movements: Extraocular movements intact.      Conjunctiva/sclera: Conjunctivae normal.      Right eye: Right conjunctiva is not injected.      Left eye: Left conjunctiva is not injected.      Pupils: Pupils are equal, round, and reactive to light.   Cardiovascular:       Rate and Rhythm: Normal rate and regular rhythm.      Heart sounds: Normal heart sounds.   Pulmonary:      Effort: Pulmonary effort is normal. No tachypnea, accessory muscle usage or respiratory distress.      Breath sounds: Normal breath sounds and air entry. Transmitted upper airway sounds present. No stridor. No wheezing, rhonchi or rales.   Abdominal:      General: Abdomen is flat.      Palpations: Abdomen is soft.   Musculoskeletal:         General: Normal range of motion.      Cervical back: Full passive range of motion without pain, normal range of motion and neck supple.   Lymphadenopathy:      Cervical: No cervical adenopathy.   Skin:     General: Skin is warm and dry.      Coloration: Skin is not pale or sallow.      Findings: No erythema, petechiae or rash.   Neurological:      General: No focal deficit present.      Mental Status: She is alert and oriented to person, place, and time. Mental status is at baseline.   Psychiatric:         Mood and Affect: Mood normal.         Behavior: Behavior normal.         Diagnostic Results   Recent Results (from the past 48 hours)   POCT Influenza A/B manually resulted    Collection Time: 11/12/24  4:14 PM   Result Value Ref Range    POC Rapid Influenza B Negative Negative   POCT BinaxNOW Covid-19 Ag Card manually resulted    Collection Time: 11/12/24  4:14 PM   Result Value Ref Range    POC YONNY-COV-2 AG  Presumptive negative test for SARS-CoV-2 (no antigen detected)     Presumptive negative test for SARS-CoV-2 (no antigen detected)       Assessment/Plan   Procedures   N/A    Problem List Items Addressed This Visit    None  Visit Diagnoses       Viral upper respiratory tract infection        Relevant Medications    brompheniramine-pseudoeph-DM 2-30-10 mg/5 mL syrup    predniSONE (Deltasone) 10 mg tablet    benzonatate (Tessalon) 200 mg capsule    Other Relevant Orders    POCT Influenza A/B manually resulted (Completed)    POCT BinaxNOW Covid-19 Ag Card manually  resulted (Completed)    Sars-CoV-2 PCR    Influenza A, and B PCR            UC Course  Patient disposition: Home    Red flags for reporting to ER have been reviewed with the patient.    Current diagnosis, any medication changes, and all in-office lab or radiologic results have been reviewed with the patient at the time of the visit.   If symptoms do not improve or worsen, patient is to follow up with PCP or report to the emergency room.   Patient is alert and oriented x3 and non-toxic appearing. Vital signs are stable.   Patient and/or guardian has sufficient decision-making capabilities at this time and reports understanding and agreement with the treatment plan made through shared decision-making.

## 2024-11-13 LAB
FLUAV RNA RESP QL NAA+PROBE: NOT DETECTED
FLUBV RNA RESP QL NAA+PROBE: NOT DETECTED
SARS-COV-2 RNA RESP QL NAA+PROBE: NOT DETECTED

## 2025-01-21 ENCOUNTER — TELEMEDICINE (OUTPATIENT)
Dept: PRIMARY CARE | Facility: CLINIC | Age: 70
End: 2025-01-21
Payer: COMMERCIAL

## 2025-01-21 DIAGNOSIS — J00 ACUTE NASOPHARYNGITIS: Primary | ICD-10-CM

## 2025-01-21 PROCEDURE — 1036F TOBACCO NON-USER: CPT | Performed by: NURSE PRACTITIONER

## 2025-01-21 PROCEDURE — 1160F RVW MEDS BY RX/DR IN RCRD: CPT | Performed by: NURSE PRACTITIONER

## 2025-01-21 PROCEDURE — 99213 OFFICE O/P EST LOW 20 MIN: CPT | Performed by: NURSE PRACTITIONER

## 2025-01-21 PROCEDURE — 1159F MED LIST DOCD IN RCRD: CPT | Performed by: NURSE PRACTITIONER

## 2025-01-21 RX ORDER — BENZONATATE 200 MG/1
200 CAPSULE ORAL 3 TIMES DAILY PRN
Qty: 42 CAPSULE | Refills: 0 | Status: SHIPPED | OUTPATIENT
Start: 2025-01-21 | End: 2025-02-20

## 2025-01-21 RX ORDER — METHYLPREDNISOLONE 4 MG/1
TABLET ORAL
Qty: 21 TABLET | Refills: 0 | Status: SHIPPED | OUTPATIENT
Start: 2025-01-21

## 2025-01-21 NOTE — PROGRESS NOTES
Subjective   Patient ID: Susana Patel is a 69 y.o. female who presents for Illness (Sore throat and cough x 3 days).    Virtual or Telephone Consent    An interactive audio and video telecommunication system which permits real time communications between the patient (at the originating site) and provider (at the distant site) was utilized to provide this telehealth service.   Verbal consent was requested and obtained from Susana Patel on this date, 01/21/25 for a telehealth visit.   Patient is located in [place]    Sore throat and cough x 3 days  Was on a cruise 1/10-1/19.  Had swollen glands and sore throat on the ship and saw doctor on ship.  Was given augmentin BID x 7 days.  Completed 1/19  Started to have symptoms in the evening.  She denies fever, shortness of breath, or chest pain  Cough is non productive.  Has had sinus headache and sinus congestion  Cough is worse during the day  Has tried dayquil and nyquil.  Nyquil has helped during the night  Took covid test and was negative              Review of Systems   All other systems reviewed and are negative.      Objective   There were no vitals taken for this visit.    Physical Exam  Constitutional:       General: She is not in acute distress.     Appearance: Normal appearance.   HENT:      Head: Normocephalic and atraumatic.      Right Ear: External ear normal.      Left Ear: External ear normal.      Nose: Nose normal.      Mouth/Throat:      Mouth: Mucous membranes are moist.   Eyes:      Extraocular Movements: Extraocular movements intact.      Conjunctiva/sclera: Conjunctivae normal.      Pupils: Pupils are equal, round, and reactive to light.   Pulmonary:      Effort: Pulmonary effort is normal.   Neurological:      Mental Status: She is alert and oriented to person, place, and time.   Psychiatric:         Mood and Affect: Mood normal.         Behavior: Behavior normal.         Thought Content: Thought content normal.         Judgment: Judgment normal.          Assessment/Plan   Problem List Items Addressed This Visit             ICD-10-CM    Acute nasopharyngitis - Primary J00     -  benzonatate   -  increase fluids  -  nasal saline  -  hand hygiene and infection prevention discussed  - medrol dose pack  - follow up with pcp if no improvement in 3-5 days  - completed course of augmentin 3 days ago - discussed viral etiology

## 2025-01-21 NOTE — ASSESSMENT & PLAN NOTE
-  benzonatate   -  increase fluids  -  nasal saline  -  hand hygiene and infection prevention discussed  - medrol dose pack  - follow up with pcp if no improvement in 3-5 days  - completed course of augmentin 3 days ago - discussed viral etiology